# Patient Record
Sex: MALE | URBAN - METROPOLITAN AREA
[De-identification: names, ages, dates, MRNs, and addresses within clinical notes are randomized per-mention and may not be internally consistent; named-entity substitution may affect disease eponyms.]

---

## 2021-03-12 ENCOUNTER — OFFICE VISIT (OUTPATIENT)
Dept: URBAN - METROPOLITAN AREA CLINIC 9 | Facility: CLINIC | Age: 81
End: 2021-03-12

## 2021-03-25 ENCOUNTER — OFFICE VISIT (OUTPATIENT)
Dept: URBAN - METROPOLITAN AREA CLINIC 9 | Facility: CLINIC | Age: 81
End: 2021-03-25

## 2021-04-12 ENCOUNTER — OFFICE VISIT (OUTPATIENT)
Dept: URBAN - METROPOLITAN AREA CLINIC 9 | Facility: CLINIC | Age: 81
End: 2021-04-12

## 2021-04-28 ENCOUNTER — OFFICE VISIT (OUTPATIENT)
Dept: URBAN - METROPOLITAN AREA SURGERY CENTER 9 | Facility: SURGERY CENTER | Age: 81
End: 2021-04-28

## 2021-05-10 ENCOUNTER — OFFICE VISIT (OUTPATIENT)
Dept: URBAN - METROPOLITAN AREA CLINIC 9 | Facility: CLINIC | Age: 81
End: 2021-05-10

## 2021-05-12 ENCOUNTER — TELEPHONE ENCOUNTER (OUTPATIENT)
Dept: URBAN - METROPOLITAN AREA CLINIC 9 | Facility: CLINIC | Age: 81
End: 2021-05-12

## 2021-05-12 ENCOUNTER — OFFICE VISIT (OUTPATIENT)
Dept: URBAN - METROPOLITAN AREA SURGERY CENTER 9 | Facility: SURGERY CENTER | Age: 81
End: 2021-05-12

## 2021-05-28 ENCOUNTER — TELEPHONE ENCOUNTER (OUTPATIENT)
Dept: URBAN - METROPOLITAN AREA CLINIC 9 | Facility: CLINIC | Age: 81
End: 2021-05-28

## 2021-06-07 ENCOUNTER — TELEPHONE ENCOUNTER (OUTPATIENT)
Dept: URBAN - METROPOLITAN AREA CLINIC 9 | Facility: CLINIC | Age: 81
End: 2021-06-07

## 2021-06-08 ENCOUNTER — OFFICE VISIT (OUTPATIENT)
Dept: URBAN - METROPOLITAN AREA SURGERY CENTER 9 | Facility: SURGERY CENTER | Age: 81
End: 2021-06-08

## 2021-06-21 ENCOUNTER — TELEPHONE ENCOUNTER (OUTPATIENT)
Dept: URBAN - METROPOLITAN AREA CLINIC 9 | Facility: CLINIC | Age: 81
End: 2021-06-21

## 2021-06-22 ENCOUNTER — OFFICE VISIT (OUTPATIENT)
Dept: URBAN - METROPOLITAN AREA SURGERY CENTER 9 | Facility: SURGERY CENTER | Age: 81
End: 2021-06-22

## 2021-06-22 ENCOUNTER — TELEPHONE ENCOUNTER (OUTPATIENT)
Dept: URBAN - METROPOLITAN AREA CLINIC 9 | Facility: CLINIC | Age: 81
End: 2021-06-22

## 2021-12-06 ENCOUNTER — OFFICE VISIT (OUTPATIENT)
Dept: UROLOGY | Facility: CLINIC | Age: 81
End: 2021-12-06

## 2021-12-06 VITALS — HEIGHT: 71 IN | BODY MASS INDEX: 19.74 KG/M2 | WEIGHT: 141 LBS

## 2021-12-06 DIAGNOSIS — C61 PROSTATE CANCER (HCC): Primary | ICD-10-CM

## 2021-12-06 DIAGNOSIS — R33.9 INCOMPLETE BLADDER EMPTYING: ICD-10-CM

## 2021-12-06 PROCEDURE — 51798 US URINE CAPACITY MEASURE: CPT | Performed by: UROLOGY

## 2021-12-06 PROCEDURE — 99203 OFFICE O/P NEW LOW 30 MIN: CPT | Performed by: UROLOGY

## 2021-12-06 PROCEDURE — 36415 COLL VENOUS BLD VENIPUNCTURE: CPT | Performed by: UROLOGY

## 2021-12-06 PROCEDURE — 84153 ASSAY OF PSA TOTAL: CPT | Performed by: UROLOGY

## 2021-12-06 RX ORDER — ATORVASTATIN CALCIUM 10 MG/1
10 TABLET, FILM COATED ORAL NIGHTLY
COMMUNITY

## 2021-12-06 RX ORDER — LISINOPRIL 10 MG/1
10 TABLET ORAL DAILY
COMMUNITY

## 2021-12-06 NOTE — PROGRESS NOTES
Office Visit New Urology      Patient Name: Shant Zelaay  : 1940   MRN: [id number]     Chief Complaint:    Chief Complaint   Patient presents with   • Establish Care   • HX prostate cancer       Referring Provider: Gio Clements,*    History of Present Illness: Shant Zelaya is a 81 y.o. male who presents to Urology today to establish care.  He has recently moved to [place] and has a history of prostate cancer.  He underwent what sounds like EBRT.  He has not seen a urologist in 2 years.  He was diagnosed 4-5 years ago.  He reports he really did not have any follow up but had his PSA checked by his primary and was less than 1.    He states he is urinating well.  He wakes up 3-4 times/night.  He denies any dysuria or gross hematuria.  He is not sexually active.  He does not get any erections.      Subjective      Review of System: Review of Systems   Constitutional: Negative for chills, fatigue, fever and unexpected weight change.   HENT: Negative for congestion, rhinorrhea and sore throat.    Eyes: Negative for visual disturbance.   Respiratory: Negative for apnea, cough, chest tightness and shortness of breath.    Cardiovascular: Negative for chest pain.   Gastrointestinal: Negative for abdominal pain, constipation, diarrhea, nausea and vomiting.   Endocrine: Negative for polydipsia and polyuria.   Genitourinary: Negative for difficulty urinating, dysuria, enuresis, flank pain, frequency, genital sores, hematuria and urgency.   Musculoskeletal: Negative for gait problem.   Skin: Negative for rash and wound.   Allergic/Immunologic: Negative for immunocompromised state.   Neurological: Negative for dizziness, tremors, syncope, weakness and light-headedness.   Hematological: Does not bruise/bleed easily.      I have reviewed the ROS documented by my clinical staff, I have updated appropriately and I agree. Karlos Moise MD    Past Medical History:   Past Medical History:   Diagnosis Date   •  Cancer (HCC)    • Diabetes mellitus (HCC)    • Hypertension    • Prostate cancer (HCC)    • Stroke (HCC)        Past Surgical History:   Past Surgical History:   Procedure Laterality Date   • EYE SURGERY         Family History:   Family History   Problem Relation Age of Onset   • No Known Problems Father    • No Known Problems Mother        Social History:   Social History     Socioeconomic History   • Marital status:    Tobacco Use   • Smoking status: Never Smoker   • Smokeless tobacco: Never Used   Vaping Use   • Vaping Use: Never used   Substance and Sexual Activity   • Alcohol use: Never   • Drug use: Never       Medications:     Current Outpatient Medications:   •  atorvastatin (LIPITOR) 10 MG tablet, Take 10 mg by mouth Every Night., Disp: , Rfl:   •  lisinopril (PRINIVIL,ZESTRIL) 10 MG tablet, Take 10 mg by mouth Daily., Disp: , Rfl:   •  metFORMIN (GLUCOPHAGE) 500 MG tablet, Take 500 mg by mouth 2 (Two) Times a Week., Disp: , Rfl:     Allergies:   No Known Allergies    IPSS Questionnaire (AUA-7):  Over the past month…    1)  Incomplete Emptying  How often have you had a sensation of not emptying your bladder?  1 - Less than 1 time in 5   2)  Frequency  How often have you had to urinate less than every two hours? 1 - Less than 1 time in 5   3)  Intermittency  How often have you found you stopped and started again several times when you urinated?  0 - Not at all   4) Urgency  How often have you found it difficult to postpone urination?  1 - Less than 1 time in 5   5) Weak Stream  How often have you had a weak urinary stream?  0 - Not at all   6) Straining  How often have you had to push or strain to begin urination?  0 - Not at all   7) Nocturia  How many times did you typically get up at night to urinate?  4 - 4 times   Total Score:  7   The International Prostate Symptom Score (IPSS) is used to screen, diagnose, track symptoms of benign prostatic hyperplasia (BPH).    0-7 pts (Mild Symptoms)  / 8-19  "pts (Moderate) / 20-35 (Severe)    Quality of life due to urinary symptoms:  If you were to spend the rest of your life with your urinary condition the way it is now, how would you feel about that? 2-Mostly Satisfied   Urine Leakage (Incontinence) 0-No Leakage       Post void residual bladder scan:   0 mL     Objective     Physical Exam:   Vital Signs:   Vitals:    12/06/21 1444   Weight: 64 kg (141 lb)   Height: 180.3 cm (71\")     Body mass index is 19.67 kg/m².     Physical Exam  Vitals and nursing note reviewed.   Constitutional:       General: He is awake. He is not in acute distress.     Appearance: Normal appearance. He is well-developed. He is obese.   HENT:      Head: Normocephalic and atraumatic.      Right Ear: External ear normal.      Left Ear: External ear normal.      Nose: Nose normal.   Eyes:      Conjunctiva/sclera: Conjunctivae normal.   Pulmonary:      Effort: Pulmonary effort is normal.   Abdominal:      General: There is no distension.      Palpations: Abdomen is soft. There is no mass.      Tenderness: There is no abdominal tenderness. There is no right CVA tenderness, left CVA tenderness, guarding or rebound.      Hernia: No hernia is present. There is no hernia in the left inguinal area or right inguinal area.   Genitourinary:     Pubic Area: No rash.       Penis: Normal.       Testes: Normal.      Rectum: No mass or tenderness. Normal anal tone.   Musculoskeletal:      Cervical back: Normal range of motion.   Lymphadenopathy:      Lower Body: No right inguinal adenopathy. No left inguinal adenopathy.   Skin:     General: Skin is warm.   Neurological:      General: No focal deficit present.      Mental Status: He is alert and oriented to person, place, and time.   Psychiatric:         Behavior: Behavior normal. Behavior is cooperative.         Labs:   Brief Urine Lab Results     None               No results found for: GLUCOSE, CALCIUM, NA, K, CO2, CL, BUN, CREATININE, EGFRIFAFRI, EGFRIFNONA, " BCR, ANIONGAP    No results found for: WBC, HGB, HCT, MCV, PLT    No results found for: PSA     Images:   No Images in the past 120 days found..    Measures:   Tobacco:   Shant Zelaya  reports that he has never smoked. He has never used smokeless tobacco..    Urine Incontinence: Patient reports that he is not currently experiencing any symptoms of urinary incontinence.      Assessment / Plan      Assessment/Plan:   Shant Zelaya is a 81 y.o. male who presented today for prostate cancer.  He is here today to establish care.  He reports that his PSAs have been less than 1 since his treatment.  I will obtain a PSA today.  As long as it is stable I will have him follow-up in 6 months with a repeat PSA.    Diagnoses and all orders for this visit:    1. Prostate cancer (HCC) (Primary)  -     Cancel: PSA DIAGNOSTIC  -     PSA DIAGNOSTIC    2. Incomplete bladder emptying  -     Bladder Scan         Patient Education:       Follow Up:   Return in about 6 months (around 6/6/2022) for Recheck with Duane.    I spent approximately 30 minutes providing clinical care for this patient; including review of patient's chart and provider documentation, face to face time spent with patient in examination room (obtaining history, performing physical exam, discussing diagnosis and management options), placing orders, and completing patient documentation.     Karlos Moise MD  [facility] [place]

## 2021-12-07 LAB — PSA SERPL-MCNC: 0.77 NG/ML (ref 0–4)

## 2022-01-14 ENCOUNTER — APPOINTMENT (OUTPATIENT)
Dept: CT IMAGING | Facility: HOSPITAL | Age: 82
End: 2022-01-14

## 2022-01-14 ENCOUNTER — HOSPITAL ENCOUNTER (EMERGENCY)
Facility: HOSPITAL | Age: 82
Discharge: HOME | End: 2022-01-14
Attending: FAMILY MEDICINE | Admitting: FAMILY MEDICINE

## 2022-01-14 VITALS
BODY MASS INDEX: 19.6 KG/M2 | HEIGHT: 71 IN | RESPIRATION RATE: 18 BRPM | SYSTOLIC BLOOD PRESSURE: 116 MMHG | HEART RATE: 83 BPM | DIASTOLIC BLOOD PRESSURE: 87 MMHG | OXYGEN SATURATION: 99 % | WEIGHT: 140 LBS | TEMPERATURE: 96.9 F

## 2022-01-14 DIAGNOSIS — R16.0 LIVER MASS: ICD-10-CM

## 2022-01-14 DIAGNOSIS — R33.9 URINARY RETENTION: Primary | ICD-10-CM

## 2022-01-14 LAB
ALBUMIN SERPL-MCNC: 3.22 G/DL (ref 3.5–5.2)
ALBUMIN/GLOB SERPL: 1 G/DL
ALP SERPL-CCNC: 113 U/L (ref 39–117)
ALT SERPL W P-5'-P-CCNC: 5 U/L (ref 1–41)
ANION GAP SERPL CALCULATED.3IONS-SCNC: 8.1 MMOL/L (ref 5–15)
AST SERPL-CCNC: 15 U/L (ref 1–40)
BASOPHILS # BLD AUTO: 0.1 10*3/MM3 (ref 0–0.2)
BASOPHILS NFR BLD AUTO: 0.9 % (ref 0–1.5)
BILIRUB SERPL-MCNC: 0.3 MG/DL (ref 0–1.2)
BILIRUB UR QL STRIP: NEGATIVE
BUN SERPL-MCNC: 21 MG/DL (ref 8–23)
BUN/CREAT SERPL: 14.9 (ref 7–25)
CALCIUM SPEC-SCNC: 8.4 MG/DL (ref 8.6–10.5)
CHLORIDE SERPL-SCNC: 105 MMOL/L (ref 98–107)
CLARITY UR: CLEAR
CO2 SERPL-SCNC: 22.9 MMOL/L (ref 22–29)
COLOR UR: YELLOW
CREAT SERPL-MCNC: 1.41 MG/DL (ref 0.76–1.27)
DEPRECATED RDW RBC AUTO: 49.2 FL (ref 37–54)
EOSINOPHIL # BLD AUTO: 0.34 10*3/MM3 (ref 0–0.4)
EOSINOPHIL NFR BLD AUTO: 3.1 % (ref 0.3–6.2)
ERYTHROCYTE [DISTWIDTH] IN BLOOD BY AUTOMATED COUNT: 17.5 % (ref 12.3–15.4)
FLUAV RNA RESP QL NAA+PROBE: NOT DETECTED
FLUBV RNA RESP QL NAA+PROBE: NOT DETECTED
GFR SERPL CREATININE-BSD FRML MDRD: 48 ML/MIN/1.73
GLOBULIN UR ELPH-MCNC: 3.4 GM/DL
GLUCOSE SERPL-MCNC: 103 MG/DL (ref 65–99)
GLUCOSE UR STRIP-MCNC: NEGATIVE MG/DL
HCT VFR BLD AUTO: 30.1 % (ref 37.5–51)
HGB BLD-MCNC: 9.3 G/DL (ref 13–17.7)
HGB UR QL STRIP.AUTO: NEGATIVE
HOLD SPECIMEN: NORMAL
HOLD SPECIMEN: NORMAL
IMM GRANULOCYTES # BLD AUTO: 0.04 10*3/MM3 (ref 0–0.05)
IMM GRANULOCYTES NFR BLD AUTO: 0.4 % (ref 0–0.5)
KETONES UR QL STRIP: NEGATIVE
LEUKOCYTE ESTERASE UR QL STRIP.AUTO: NEGATIVE
LYMPHOCYTES # BLD AUTO: 1.8 10*3/MM3 (ref 0.7–3.1)
LYMPHOCYTES NFR BLD AUTO: 16.3 % (ref 19.6–45.3)
MCH RBC QN AUTO: 24 PG (ref 26.6–33)
MCHC RBC AUTO-ENTMCNC: 30.9 G/DL (ref 31.5–35.7)
MCV RBC AUTO: 77.6 FL (ref 79–97)
MONOCYTES # BLD AUTO: 1.05 10*3/MM3 (ref 0.1–0.9)
MONOCYTES NFR BLD AUTO: 9.5 % (ref 5–12)
NEUTROPHILS NFR BLD AUTO: 69.8 % (ref 42.7–76)
NEUTROPHILS NFR BLD AUTO: 7.7 10*3/MM3 (ref 1.7–7)
NITRITE UR QL STRIP: NEGATIVE
NRBC BLD AUTO-RTO: 0 /100 WBC (ref 0–0.2)
PH UR STRIP.AUTO: <=5 [PH] (ref 5–8)
PLATELET # BLD AUTO: 607 10*3/MM3 (ref 140–450)
PMV BLD AUTO: 8.9 FL (ref 6–12)
POTASSIUM SERPL-SCNC: 5 MMOL/L (ref 3.5–5.2)
PROT SERPL-MCNC: 6.6 G/DL (ref 6–8.5)
PROT UR QL STRIP: NEGATIVE
RBC # BLD AUTO: 3.88 10*6/MM3 (ref 4.14–5.8)
SARS-COV-2 RNA RESP QL NAA+PROBE: NOT DETECTED
SODIUM SERPL-SCNC: 136 MMOL/L (ref 136–145)
SP GR UR STRIP: 1.01 (ref 1–1.03)
UROBILINOGEN UR QL STRIP: NORMAL
WBC NRBC COR # BLD: 11.03 10*3/MM3 (ref 3.4–10.8)
WHOLE BLOOD HOLD SPECIMEN: NORMAL
WHOLE BLOOD HOLD SPECIMEN: NORMAL

## 2022-01-14 PROCEDURE — 51798 US URINE CAPACITY MEASURE: CPT

## 2022-01-14 PROCEDURE — 85025 COMPLETE CBC W/AUTO DIFF WBC: CPT | Performed by: PHYSICIAN ASSISTANT

## 2022-01-14 PROCEDURE — 71250 CT THORAX DX C-: CPT

## 2022-01-14 PROCEDURE — 51702 INSERT TEMP BLADDER CATH: CPT

## 2022-01-14 PROCEDURE — 72131 CT LUMBAR SPINE W/O DYE: CPT

## 2022-01-14 PROCEDURE — 87636 SARSCOV2 & INF A&B AMP PRB: CPT | Performed by: FAMILY MEDICINE

## 2022-01-14 PROCEDURE — 36415 COLL VENOUS BLD VENIPUNCTURE: CPT

## 2022-01-14 PROCEDURE — 99283 EMERGENCY DEPT VISIT LOW MDM: CPT

## 2022-01-14 PROCEDURE — 81003 URINALYSIS AUTO W/O SCOPE: CPT | Performed by: PHYSICIAN ASSISTANT

## 2022-01-14 PROCEDURE — 74176 CT ABD & PELVIS W/O CONTRAST: CPT

## 2022-01-14 PROCEDURE — 74176 CT ABD & PELVIS W/O CONTRAST: CPT | Performed by: RADIOLOGY

## 2022-01-14 PROCEDURE — 80053 COMPREHEN METABOLIC PANEL: CPT | Performed by: PHYSICIAN ASSISTANT

## 2022-01-14 NOTE — ED NOTES
MEDICAL SCREENING:    Reason for Visit: Difficulty urinating    Patient initially seen in triage.  The patient was advised further evaluation and diagnostic testing will be needed, some of the treatment and testing will be initiated in the lobby in order to begin the process.  The patient will be returned to the waiting area for the time being and possibly be re-assessed by a subsequent ED provider.  The patient will be brought back to the treatment area in as timely manner as possible.         Ej Kirby PA  01/14/22 1414

## 2022-01-14 NOTE — ED PROVIDER NOTES
Subjective   82-year-old male with a history of prostate cancer remotely status post radiation treatment presents with emergency room plaints of difficulty urinating patient reports for the past 2 days he said difficulty urinating.  He states that he is able to urinate when he lays to his side.  He went to his family doctor and placed him on oral antibiotics however symptoms persist.  He denies fever or chills.  He has had a stroke in the past for which he has right-sided deficits.  Stroke was in 2016.  Patient denies headache or neck pain.      Difficulty Urinating  Presenting symptoms comment:  Difficulty urinating  Context: spontaneously    Relieved by:  Position  Worsened by:  Nothing  Associated symptoms: urinary frequency and urinary retention    Associated symptoms: no abdominal pain, no diarrhea, no fever, no flank pain, no nausea, no scrotal swelling and no vomiting        Review of Systems   Constitutional: Negative for fever.   Gastrointestinal: Negative for abdominal pain, diarrhea, nausea and vomiting.   Genitourinary: Positive for difficulty urinating and frequency. Negative for flank pain and scrotal swelling.   All other systems reviewed and are negative.      Past Medical History:   Diagnosis Date   • Cancer (HCC)    • Diabetes mellitus (HCC)    • Hypertension    • Prostate cancer (HCC)    • Stroke (HCC)        No Known Allergies    Past Surgical History:   Procedure Laterality Date   • EYE SURGERY         Family History   Problem Relation Age of Onset   • No Known Problems Father    • No Known Problems Mother        Social History     Socioeconomic History   • Marital status:    Tobacco Use   • Smoking status: Never Smoker   • Smokeless tobacco: Never Used   Vaping Use   • Vaping Use: Never used   Substance and Sexual Activity   • Alcohol use: Never   • Drug use: Never           Objective   Physical Exam  Vitals and nursing note reviewed.   Constitutional:       Comments: Chronically  ill-appearing.   HENT:      Head: Normocephalic and atraumatic.      Comments: Moist mucous membranes.  Patient is hard of hearing.     Nose: Nose normal.      Mouth/Throat:      Mouth: Mucous membranes are moist.   Eyes:      Pupils: Pupils are equal, round, and reactive to light.   Cardiovascular:      Rate and Rhythm: Normal rate and regular rhythm.   Abdominal:      General: Bowel sounds are normal.      Palpations: Abdomen is soft.      Comments: Umbilical hernia nontender.  NABS.   Genitourinary:     Comments: Testes descended bilaterally.  No testicular tenderness.  Uncircumcised.  No penile discharge.  No inguinal adenopathy.  Musculoskeletal:      Cervical back: No tenderness.      Right lower leg: No edema.      Left lower leg: No edema.   Skin:     General: Skin is warm and dry.   Neurological:      Mental Status: He is alert.      Comments: Right-sided weakness contracted right upper extremity.   Psychiatric:         Mood and Affect: Mood normal.         Procedures           ED Course  ED Course as of 01/14/22 1909 Fri Jan 14, 2022   1642 Patient greater than 999 mL on bladder scan have ordered Rodríguez catheter. [BB]   1711 Patient urinalysis unremarkable. [BB]   1725 Patient white blood count 1.03 hemoglobin 9.3 hematocrit 30.1.  Patient was noted to have 1500 mL returned from Rodríguez catheter placed initially have clamped catheter will allow patient to acclimate and reopen Rodríguez catheter. [BB]   1807 Patient creatinine 1.41 [BB]   1843 Patient CT scan of abdomen pelvis shows increased rectal wall thickening as well as abnormality liver concerning for metastatic disease. [BB]   1855 Have discussed findings with Dr. Espinoza with general surgery who states that he could eventually do a sigmoidoscopy anoscopy versus liver biopsy of lesions.  He states he will see patient in office next week.  Have discussed with patient and his son as far as findings and would what he want undergo treatment in light of his  current health status and age patient is ambivalent but is agreeable to follow-up with general surgery to weigh his options.  Have contacted Dr. Heard with hem/onc and awaiting callback [BB]   1906 Patient to have wright left in place.  Per heme-onc patient can follow-up after follow-up with general surgery. [BB]      ED Course User Index  [BB] Johny Egan MD                                                 [facility]    Final diagnoses:   Urinary retention   Liver mass       ED Disposition  ED Disposition     ED Disposition Condition Comment    Discharge Stable           Sulaiman Lopez MD  [address]  [address]  [place] [address]  [phone]    In 2 days      Shawn Espinoza MD  [address]  [address]  [place] [address]  [phone]    Call in 2 days      Tanya Kramer MD  [address]  [place] [address]  [phone]    Call            Medication List      No changes were made to your prescriptions during this visit.          Johny Egan MD  01/14/22 0835

## 2022-01-18 ENCOUNTER — TELEPHONE (OUTPATIENT)
Dept: UROLOGY | Facility: CLINIC | Age: 82
End: 2022-01-18

## 2022-01-18 ENCOUNTER — TELEPHONE (OUTPATIENT)
Dept: ONCOLOGY | Facility: CLINIC | Age: 82
End: 2022-01-18

## 2022-01-18 NOTE — TELEPHONE ENCOUNTER
Hub staff attempted to follow warm transfer process and was unsuccessful     Caller: ADLO GAO    Relationship to patient: Emergency Contact    Best call back number: [phone]    Patient is needing: PT DAUGHTER HAS SEVERAL QUESTIONS REGARDING CATH SUPPLIES. STATES BAG IS LEAKING.

## 2022-01-18 NOTE — TELEPHONE ENCOUNTER
Caller: ALDO GAO    Relationship: Emergency Contact      What was the call regarding:     DAD TAKEN TO [facility] ER Friday 1/14/22 AND WAS DISCOVERED HAS COLON CANCER SPREAD TO  LIVER ADVISED TO FOLLOW UP WITH ONCOLOGY AND GENERAL SURGERY, DID NOT CALL SURGERY FIRST IS HESITANT BECAUSE DAD IS 82 YEARS OLD AND DIDN'T WANT TO GO THAT ROUTE FIRST.       PER THE DISCHARGE NOTES 1/14/22    STATES (Patient to have wright left in place.  Per heme-onc patient can follow-up after follow-up with general surgery.)    I CALLED AND SPOKE WITH XAVIER AT  WHO CONSULTED REF COORDINATOR OC    AND SHE ADVISED THAT THEY WOULD NEED TO FOLLOW UP WITH GENERAL SURGERY FIRST AND THEN SEE HEM/ONC OR CAN HAVE PCP REFER OVER ALSO IF DID NOT WANT TO GO THROUGH GENERAL SURGERY FIRST    I ADVISED THIS TO ALDO AND SHE V/U

## 2022-02-01 ENCOUNTER — OFFICE VISIT (OUTPATIENT)
Dept: SURGERY | Facility: CLINIC | Age: 82
End: 2022-02-01

## 2022-02-01 ENCOUNTER — HOSPITAL ENCOUNTER (OUTPATIENT)
Facility: HOSPITAL | Age: 82
Setting detail: HOSPITAL OUTPATIENT SURGERY
End: 2022-02-01
Attending: SURGERY | Admitting: SURGERY

## 2022-02-01 VITALS
WEIGHT: 145 LBS | BODY MASS INDEX: 20.3 KG/M2 | DIASTOLIC BLOOD PRESSURE: 77 MMHG | SYSTOLIC BLOOD PRESSURE: 100 MMHG | HEART RATE: 66 BPM | HEIGHT: 71 IN

## 2022-02-01 DIAGNOSIS — K63.89 COLONIC MASS: Primary | ICD-10-CM

## 2022-02-01 PROCEDURE — 99204 OFFICE O/P NEW MOD 45 MIN: CPT | Performed by: SURGERY

## 2022-02-01 RX ORDER — SODIUM, POTASSIUM,MAG SULFATES 17.5-3.13G
2 SOLUTION, RECONSTITUTED, ORAL ORAL EVERY 12 HOURS
Qty: 177 ML | Refills: 0 | Status: SHIPPED | OUTPATIENT
Start: 2022-02-01

## 2022-02-01 NOTE — PROGRESS NOTES
Subjective   Shant Zelaya is a 82 y.o. male is being seen for consultation today at the request of Provider, No Known    Shant Zelaya is a 82 y.o. male Who recently developed urinary retention.  In the emergency department a Rodríguez catheter was placed and he underwent CT of the chest abdomen and pelvis.  Incidentally he was noted to have multiple hypodense lesions consistent with metastatic liver disease of unknown primary.  No previous endoscopy reported.  On imaging the patient has an area of the descending colon as well as the rectum that appeared to represent possible areas of wall thickening that may indicate colorectal primary.  No blood per rectum.      Past Medical History:   Diagnosis Date   • Cancer (HCC)    • Diabetes mellitus (HCC)    • Hypertension    • Prostate cancer (HCC)    • Stroke (HCC)        Family History   Problem Relation Age of Onset   • No Known Problems Father    • No Known Problems Mother        Social History     Socioeconomic History   • Marital status:    Tobacco Use   • Smoking status: Never Smoker   • Smokeless tobacco: Never Used   Vaping Use   • Vaping Use: Never used   Substance and Sexual Activity   • Alcohol use: Never   • Drug use: Never       Past Surgical History:   Procedure Laterality Date   • EYE SURGERY         Review of Systems   Constitutional: Negative for activity change, appetite change, chills and fever.   HENT: Negative for sore throat and trouble swallowing.    Eyes: Negative for visual disturbance.   Respiratory: Negative for cough and shortness of breath.    Cardiovascular: Negative for chest pain and palpitations.   Gastrointestinal: Negative for abdominal distention, abdominal pain, blood in stool, constipation, diarrhea, nausea and vomiting.   Endocrine: Negative for cold intolerance and heat intolerance.   Genitourinary: Positive for difficulty urinating. Negative for dysuria.   Musculoskeletal: Negative for joint swelling.   Skin: Negative for color  "change, rash and wound.   Allergic/Immunologic: Negative for immunocompromised state.   Neurological: Negative for dizziness, seizures, weakness and headaches.   Hematological: Negative for adenopathy. Does not bruise/bleed easily.   Psychiatric/Behavioral: Negative for agitation and confusion.         /77   Pulse 66   Ht 180.3 cm (70.98\")   Wt 65.8 kg (145 lb)   BMI 20.23 kg/m²   Objective   Physical Exam  Constitutional:       Appearance: He is well-developed.   HENT:      Head: Normocephalic and atraumatic.   Eyes:      Conjunctiva/sclera: Conjunctivae normal.      Pupils: Pupils are equal, round, and reactive to light.   Neck:      Thyroid: No thyromegaly.      Vascular: No JVD.      Trachea: No tracheal deviation.   Cardiovascular:      Rate and Rhythm: Normal rate and regular rhythm.      Heart sounds: No murmur heard.  No friction rub. No gallop.    Pulmonary:      Effort: Pulmonary effort is normal.      Breath sounds: Normal breath sounds.   Abdominal:      General: There is no distension.      Palpations: Abdomen is soft. There is no hepatomegaly or splenomegaly.      Tenderness: There is no abdominal tenderness.      Hernia: No hernia is present.   Genitourinary:     Comments: Rodríguez catheter place with clear urine output  Musculoskeletal:         General: No deformity. Normal range of motion.      Cervical back: Neck supple.   Skin:     General: Skin is warm and dry.   Neurological:      Mental Status: He is alert and oriented to person, place, and time.               Assessment   Diagnoses and all orders for this visit:    1. Colonic mass (Primary)  -     Case Request; Standing  -     COVID PRE-OP / PRE-PROCEDURE SCREENING ORDER (NO ISOLATION) - Swab, Nasopharynx; Future  -     Case Request  -     CT Needle Biopsy Liver; Future    Other orders  -     Follow Anesthesia Guidelines / Protocol; Future  -     Provide NPO Instructions to Patient; Future  -     Chlorhexidine Skin Prep; Future  -     " sodium-potassium-magnesium sulfates (Suprep Bowel Prep Kit) 17.5-3.13-1.6 GM/177ML solution oral solution; Take 2 bottles by mouth Every 12 (Twelve) Hours.  Dispense: 177 mL; Refill: 0      Shant Garcia is a 82 y.o. male with imaging at the time of urinary retention that is revealed multiple hypodense lesions concerning for metastatic liver disease of unknown primary.  The patient has never had EGD and colonoscopy and there were some concerning lesions of the descending colon and rectum that may represent primary colon cancer.  He will be scheduled for radiology guided biopsy of the liver lesions and I will perform EGD and colonoscopy to try to identify primary.    Patient's Body mass index is 20.23 kg/m². indicating that he is within normal range (BMI 18.5-24.9). No BMI management plan needed..

## 2022-02-02 ENCOUNTER — OFFICE VISIT (OUTPATIENT)
Dept: UROLOGY | Facility: CLINIC | Age: 82
End: 2022-02-02

## 2022-02-02 VITALS
HEART RATE: 83 BPM | WEIGHT: 145 LBS | HEIGHT: 71 IN | DIASTOLIC BLOOD PRESSURE: 78 MMHG | SYSTOLIC BLOOD PRESSURE: 100 MMHG | BODY MASS INDEX: 20.3 KG/M2

## 2022-02-02 DIAGNOSIS — R33.9 URINARY RETENTION: Primary | ICD-10-CM

## 2022-02-02 PROCEDURE — 99213 OFFICE O/P EST LOW 20 MIN: CPT | Performed by: UROLOGY

## 2022-02-02 RX ORDER — FINASTERIDE 5 MG/1
5 TABLET, FILM COATED ORAL DAILY
Qty: 90 TABLET | Refills: 3 | Status: SHIPPED | OUTPATIENT
Start: 2022-02-02 | End: 2023-01-28

## 2022-02-02 RX ORDER — TAMSULOSIN HYDROCHLORIDE 0.4 MG/1
1 CAPSULE ORAL DAILY
Qty: 90 CAPSULE | Refills: 3 | Status: SHIPPED | OUTPATIENT
Start: 2022-02-02 | End: 2023-01-28

## 2022-02-02 NOTE — PROGRESS NOTES
Follow Up Office Visit      Patient Name: Shant Zelaya  : 1940   MRN: [id number]     Chief Complaint:    Chief Complaint   Patient presents with   • Hospital Follow Up Visit   • Urinary Retention       Referring Provider: No ref. provider found    History of Present Illness: Shant Zelaya is a 82 y.o. male who presents today for follow up of  Urinary retention.  He has a history of prostate cancer treated with EBRT.  Approximately 2 weeks ago he went into retention.  He was unable to urinate for approximately 2 days.  He then went to the ER and had a catheter placed and had greater than a liter of urine drained.  He has never gone into retention before.  He had a CT scan in the ER which showed thickened rectal wall along with concern for metastatic lesions in the liver.   He reports he has not showered or bathed since the catheter went in.      I reviewed his CT scan from 22 which did not reveal any hydronephrosis or concerning  findings.      His PSA after his last visit was 0.7.     Subjective      Review of System: Review of Systems   Constitutional: Negative.    HENT: Negative.    Eyes: Negative.    Respiratory: Negative.    Cardiovascular: Negative.    Gastrointestinal: Negative.    Endocrine: Negative.    Genitourinary: Positive for decreased urine volume, difficulty urinating, dysuria and flank pain.   Skin: Negative.    Allergic/Immunologic: Negative.    Neurological: Negative.    Hematological: Negative.    Psychiatric/Behavioral: Negative.       I have reviewed the ROS documented by my clinical staff, I have updated appropriately and I agree. Karlos Moise MD    I have reviewed and the following portions of the patient's history were updated as appropriate: past family history, past medical history, past social history, past surgical history and problem list.    Past Medical History:   Past Medical History:   Diagnosis Date   • Cancer (HCC)    • Diabetes mellitus (HCC)    • Hypertension     • Prostate cancer (HCC)    • Stroke (HCC)        Past Surgical History:  Past Surgical History:   Procedure Laterality Date   • EYE SURGERY         Family History:   family history includes No Known Problems in his father and mother.   Otherwise pertinent FHx was reviewed and not pertinent to current issue.    Social History:    reports that he has never smoked. He has never used smokeless tobacco. He reports that he does not drink alcohol and does not use drugs.    Medications:     Current Outpatient Medications:   •  atorvastatin (LIPITOR) 10 MG tablet, Take 10 mg by mouth Every Night., Disp: , Rfl:   •  lisinopril (PRINIVIL,ZESTRIL) 10 MG tablet, Take 10 mg by mouth Daily., Disp: , Rfl:   •  metFORMIN (GLUCOPHAGE) 500 MG tablet, Take 500 mg by mouth 2 (Two) Times a Week., Disp: , Rfl:   •  sodium-potassium-magnesium sulfates (Suprep Bowel Prep Kit) 17.5-3.13-1.6 GM/177ML solution oral solution, Take 2 bottles by mouth Every 12 (Twelve) Hours., Disp: 177 mL, Rfl: 0  •  finasteride (PROSCAR) 5 MG tablet, Take 1 tablet by mouth Daily for 360 days., Disp: 90 tablet, Rfl: 3  •  tamsulosin (FLOMAX) 0.4 MG capsule 24 hr capsule, Take 1 capsule by mouth Daily for 360 days., Disp: 90 capsule, Rfl: 3    Allergies:   No Known Allergies    IPSS Questionnaire (AUA-7):  Over the past month…    1)  Incomplete Emptying  How often have you had a sensation of not emptying your bladder?  1 - Less than 1 time in 5   2)  Frequency  How often have you had to urinate less than every two hours? 1 - Less than 1 time in 5   3)  Intermittency  How often have you found you stopped and started again several times when you urinated?  1 - Less than 1 time in 5   4) Urgency  How often have you found it difficult to postpone urination?  1 - Less than 1 time in 5   5) Weak Stream  How often have you had a weak urinary stream?  1 - Less than 1 time in 5   6) Straining  How often have you had to push or strain to begin urination?  1 - Less than  "1 time in 5   7) Nocturia  How many times did you typically get up at night to urinate?  1 - 1 time   Total Score:  7   The International Prostate Symptom Score (IPSS) is used to screen, diagnose, track symptoms of benign prostatic hyperplasia (BPH).    0-7 pts (Mild Symptoms)  / 8-19 pts (Moderate) / 20-35 (Severe)    Quality of life due to urinary symptoms:  If you were to spend the rest of your life with your urinary condition the way it is now, how would you feel about that? 2-Mostly Satisfied   Urine Leakage (Incontinence) 1-Mild (A few drops a day, no pad use)              Objective     Physical Exam:   Vital Signs:   Vitals:    02/02/22 1330   BP: 100/78   BP Location: Right arm   Patient Position: Sitting   Cuff Size: Adult   Pulse: 83   Weight: 65.8 kg (145 lb)   Height: 180.3 cm (70.98\")     Body mass index is 20.23 kg/m².     Physical Exam  Vitals and nursing note reviewed.   Constitutional:       General: He is awake. He is not in acute distress.     Appearance: Normal appearance. He is well-developed.   HENT:      Head: Normocephalic and atraumatic.      Right Ear: External ear normal.      Left Ear: External ear normal.      Nose: Nose normal.   Eyes:      Conjunctiva/sclera: Conjunctivae normal.   Pulmonary:      Effort: Pulmonary effort is normal.   Abdominal:      General: There is no distension.      Palpations: Abdomen is soft. There is no mass.      Tenderness: There is no abdominal tenderness. There is no right CVA tenderness, left CVA tenderness, guarding or rebound.      Hernia: No hernia is present. There is no hernia in the left inguinal area or right inguinal area.   Genitourinary:     Pubic Area: No rash.       Penis: Normal.       Testes: Normal.      Rectum: No mass or tenderness. Normal anal tone.      Comments: Rodríguez catheter in place with some discharge from the penis.   Musculoskeletal:      Cervical back: Normal range of motion.   Lymphadenopathy:      Lower Body: No right inguinal " adenopathy. No left inguinal adenopathy.   Skin:     General: Skin is warm.   Neurological:      General: No focal deficit present.      Mental Status: He is alert and oriented to person, place, and time.   Psychiatric:         Behavior: Behavior normal. Behavior is cooperative.         Labs:   Brief Urine Lab Results  (Last result in the past 365 days)      Color   Clarity   Blood   Leuk Est   Nitrite   Protein   CREAT   Urine HCG        01/14/22 1656 Yellow   Clear   Negative   Negative   Negative   Negative                      Lab Results   Component Value Date    GLUCOSE 103 (H) 01/14/2022    CALCIUM 8.4 (L) 01/14/2022     01/14/2022    K 5.0 01/14/2022    CO2 22.9 01/14/2022     01/14/2022    BUN 21 01/14/2022    CREATININE 1.41 (H) 01/14/2022    EGFRIFNONA 48 (L) 01/14/2022    BCR 14.9 01/14/2022    ANIONGAP 8.1 01/14/2022       Lab Results   Component Value Date    WBC 11.03 (H) 01/14/2022    HGB 9.3 (L) 01/14/2022    HCT 30.1 (L) 01/14/2022    MCV 77.6 (L) 01/14/2022     (H) 01/14/2022       Lab Results   Component Value Date    PSA 0.767 12/06/2021       Images:   CT Abdomen Pelvis Without Contrast    Result Date: 1/14/2022   1. Appearance most concerning for metastatic disease to the liver  2.Abnormal soft tissue along the rectal wall. Recommend clinical correlation  3. Large stool burden 4. Other findings as above     This report was finalized on 1/14/2022 6:38 PM by Dr. Hussain Alberts MD.      CT Chest Without Contrast Diagnostic    Result Date: 1/14/2022  1. No acute pulmonary process. 2. Redemonstration of hepatic lesions; suggestion of metastatic disease 3. Descending thoracic aortic aneurysm. Annual surveillance recommended. Signer Name: Ann Goodman MD  Signed: 1/14/2022 8:27 PM  Workstation Name: [facility]  [facility]    CT Lumbar Spine Without Contrast    Result Date: 1/14/2022  #1 there is no bony destructive process. There is no CT evidence for  bony metastatic disease. This does not exclude the diagnosis and if there is concern for osseous metastatic disease consider correlation with whole-body bone scan or an MRI if the patient is a candidate. 2. There is multiple level lumbar degenerative change with degenerative canal and foraminal compromise detailed in the level by level discussion. 3. There is grade 1-2 anterolisthesis of L5 on S1 secondary to facet arthritis and grade 1 anterolisthesis of L4 on L5 secondary to facet arthritis. 4. See earlier CT abdomen pelvis today for description of abnormalities. Signer Name: Elayne Loomis MD  Signed: 1/14/2022 8:34 PM  Workstation Name: [email or web address]  [facility]      Measures:   Tobacco:   Shant Zelaya  reports that he has never smoked. He has never used smokeless tobacco.    Urine Incontinence: Patient reports that he is not currently experiencing any symptoms of urinary incontinence.    Assessment / Plan      Assessment/Plan:   82 y.o. male who presented today for follow up of urinary retention.  He has not on any medication for his urinary retention so I will start him on both tamsulosin and finasteride.  I discussed the possible side effects of the medication.  I will have him follow up in 2 weeks for possible TOV.  We discussed if he fails then we will need to discuss TURP.  He is scheduled for colonoscopy next week.  I also discussed the importance of hygiene and catheter care.     Diagnoses and all orders for this visit:    1. Urinary retention (Primary)  -     tamsulosin (FLOMAX) 0.4 MG capsule 24 hr capsule; Take 1 capsule by mouth Daily for 360 days.  Dispense: 90 capsule; Refill: 3  -     finasteride (PROSCAR) 5 MG tablet; Take 1 tablet by mouth Daily for 360 days.  Dispense: 90 tablet; Refill: 3           Follow Up:   Return in about 2 weeks (around 2/16/2022) for Recheck.    I spent approximately 20 minutes providing clinical care for this patient; including review of patient's  chart and provider documentation, face to face time spent with patient in examination room (obtaining history, performing physical exam, discussing diagnosis and management options), placing orders, and completing patient documentation.     Karlos Moise MD  [facility]

## 2022-02-03 ENCOUNTER — TELEPHONE (OUTPATIENT)
Dept: SURGERY | Facility: CLINIC | Age: 82
End: 2022-02-03

## 2022-02-03 NOTE — TELEPHONE ENCOUNTER
Talked with patients daughter , gave her surgery time and bowel prep instructions and covid, and gave radiology appointment, patient daughter voiced understanding.

## 2022-02-22 ENCOUNTER — APPOINTMENT (OUTPATIENT)
Dept: CT IMAGING | Facility: HOSPITAL | Age: 82
End: 2022-02-22

## 2022-07-30 ENCOUNTER — TELEPHONE ENCOUNTER (OUTPATIENT)
Age: 82
End: 2022-07-30

## 2022-07-31 ENCOUNTER — TELEPHONE ENCOUNTER (OUTPATIENT)
Age: 82
End: 2022-07-31